# Patient Record
Sex: MALE | Race: WHITE | NOT HISPANIC OR LATINO | URBAN - METROPOLITAN AREA
[De-identification: names, ages, dates, MRNs, and addresses within clinical notes are randomized per-mention and may not be internally consistent; named-entity substitution may affect disease eponyms.]

---

## 2018-06-07 DIAGNOSIS — I87.2 VENOUS INSUFFICIENCY OF BOTH LOWER EXTREMITIES: Primary | ICD-10-CM

## 2018-07-03 ENCOUNTER — TELEPHONE (OUTPATIENT)
Dept: VASCULAR SURGERY | Facility: CLINIC | Age: 83
End: 2018-07-03

## 2018-07-03 NOTE — NURSING
Left message on patient's voicemail that call was returned and appointment cancelled as per request. GSL/LPN

## 2018-07-03 NOTE — TELEPHONE ENCOUNTER
----- Message from Edwin Granado MD sent at 7/3/2018  2:10 PM CDT -----  Contact: patient  Noted  Please send future messages to my staff - attached here    ----- Message -----  From: Isi Evelyn  Sent: 7/3/2018  11:48 AM  To: Edwin Granado MD    Patient says he cannot keep on 7/19 @ [facility] and he has not been able to speak to anyone there.   He asks that we send you a message so you or your nurse can call him @ [phone] or [phone].